# Patient Record
(demographics unavailable — no encounter records)

---

## 2024-10-10 NOTE — PROCEDURE
[Large Joint Injection] : Large joint injection [Left] : of the left [Knee] : knee [Pain] : pain [Inflammation] : inflammation [X-ray evidence of Osteoarthritis on this or prior visit] : x-ray evidence of Osteoarthritis on this or prior visit [Repeat series performed] : repeat series performed [Alcohol] : alcohol [Betadine] : betadine [Ethyl Chloride sprayed topically] : ethyl chloride sprayed topically [Durolane (60mg)] : 60mg of Durolane [] : Patient tolerated procedure well [Call if redness, pain or fever occur] : call if redness, pain or fever occur [Apply ice for 15min out of every hour for the next 12-24 hours as tolerated] : apply ice for 15 minutes out of every hour for the next 12-24 hours as tolerated [Patient was advised to rest the joint(s) for ____ days] : patient was advised to rest the joint(s) for [unfilled] days [Previous OTC use and PT nontherapeutic] : patient has tried OTC's including aspirin, Ibuprofen, Aleve, etc or prescription NSAIDS, and/or exercises at home and/or physical therapy without satisfactory response [Patient had decreased mobility in the joint] : patient had decreased mobility in the joint [Risks, benefits, alternatives discussed / Verbal consent obtained] : the risks benefits, and alternatives have been discussed, and verbal consent was obtained [Altered anatomic landmarks d/t erosive arthritis] : altered anatomic landmarks d/t erosive arthritis [All ultrasound images have been permanently captured and stored accordingly in our picture archiving and communication system] : All ultrasound images have been permanently captured and stored accordingly in our picture archiving and communication system [Visualization of the needle and placement of injection was performed without complication] : visualization of the needle and placement of injection was performed without complication

## 2025-01-08 NOTE — DISCUSSION/SUMMARY
[de-identified] : We discussed formal PT, a home exercise program, ice therapy and the role of NSAIDS for the pain. These modalities will improve the patient's functionality in their activities of daily life.  Risks, benefits and contraindications were discussed.  The patient will follow up in 6 weeks or sooner as needed.

## 2025-01-08 NOTE — WORK
[Are consistent with the injury] : are consistent with the injury [Partial] : partial [Does not reveal pre-existing condition(s) that may affect treatment/prognosis] : does not reveal pre-existing condition(s) that may affect treatment/prognosis [Patient] : patient [I provided the services listed above] :  I provided the services listed above.

## 2025-05-05 NOTE — DISCUSSION/SUMMARY
[de-identified] : We discussed a subsequent course of viscosupplementation as well as continued home exercise program, ice therapy and the role of NSAIDS for the pain. These modalities will improve the patient's functionality in their activities of daily life.  Risks, benefits and contraindications were discussed.  The patient will follow up in 6 weeks or sooner as needed.